# Patient Record
Sex: FEMALE | Race: WHITE | NOT HISPANIC OR LATINO | Employment: OTHER | URBAN - NONMETROPOLITAN AREA
[De-identification: names, ages, dates, MRNs, and addresses within clinical notes are randomized per-mention and may not be internally consistent; named-entity substitution may affect disease eponyms.]

---

## 2023-04-27 ENCOUNTER — APPOINTMENT (EMERGENCY)
Dept: CT IMAGING | Facility: HOSPITAL | Age: 48
End: 2023-04-27

## 2023-04-27 ENCOUNTER — HOSPITAL ENCOUNTER (EMERGENCY)
Facility: HOSPITAL | Age: 48
Discharge: HOME | End: 2023-04-28
Attending: EMERGENCY MEDICINE

## 2023-04-27 VITALS
OXYGEN SATURATION: 97 % | DIASTOLIC BLOOD PRESSURE: 106 MMHG | HEIGHT: 68 IN | HEART RATE: 119 BPM | WEIGHT: 201 LBS | BODY MASS INDEX: 30.46 KG/M2 | RESPIRATION RATE: 18 BRPM | TEMPERATURE: 99 F | SYSTOLIC BLOOD PRESSURE: 196 MMHG

## 2023-04-27 DIAGNOSIS — K12.2 CELLULITIS OF MOUTH: ICD-10-CM

## 2023-04-27 DIAGNOSIS — R79.1 SUPRATHERAPEUTIC INR: ICD-10-CM

## 2023-04-27 DIAGNOSIS — K04.7 DENTAL ABSCESS: Primary | ICD-10-CM

## 2023-04-27 LAB
ANION GAP SERPL CALCULATED.3IONS-SCNC: 11 MMOL/L (ref 4–13)
BASOPHILS # BLD AUTO: 0.04 THOUSANDS/ΜL (ref 0–0.1)
BASOPHILS NFR BLD AUTO: 1 % (ref 0–1)
BUN SERPL-MCNC: 8 MG/DL (ref 5–25)
CALCIUM SERPL-MCNC: 9.4 MG/DL (ref 8.4–10.2)
CHLORIDE SERPL-SCNC: 100 MMOL/L (ref 96–108)
CO2 SERPL-SCNC: 25 MMOL/L (ref 21–32)
CREAT SERPL-MCNC: 0.72 MG/DL (ref 0.6–1.3)
CRP SERPL QL: 35.8 MG/L
EOSINOPHIL # BLD AUTO: 0.03 THOUSAND/ΜL (ref 0–0.61)
EOSINOPHIL NFR BLD AUTO: 0 % (ref 0–6)
ERYTHROCYTE [DISTWIDTH] IN BLOOD BY AUTOMATED COUNT: 17.1 % (ref 11.6–15.1)
ERYTHROCYTE [SEDIMENTATION RATE] IN BLOOD: 58 MM/HOUR (ref 0–19)
GFR SERPL CREATININE-BSD FRML MDRD: 98 ML/MIN/1.73SQ M
GLUCOSE SERPL-MCNC: 170 MG/DL (ref 65–140)
HCT VFR BLD AUTO: 40 % (ref 34.8–46.1)
HGB BLD-MCNC: 12.9 G/DL (ref 11.5–15.4)
IMM GRANULOCYTES # BLD AUTO: 0.03 THOUSAND/UL (ref 0–0.2)
IMM GRANULOCYTES NFR BLD AUTO: 0 % (ref 0–2)
LYMPHOCYTES # BLD AUTO: 0.99 THOUSANDS/ΜL (ref 0.6–4.47)
LYMPHOCYTES NFR BLD AUTO: 13 % (ref 14–44)
MCH RBC QN AUTO: 28.7 PG (ref 26.8–34.3)
MCHC RBC AUTO-ENTMCNC: 32.3 G/DL (ref 31.4–37.4)
MCV RBC AUTO: 89 FL (ref 82–98)
MONOCYTES # BLD AUTO: 0.35 THOUSAND/ΜL (ref 0.17–1.22)
MONOCYTES NFR BLD AUTO: 5 % (ref 4–12)
NEUTROPHILS # BLD AUTO: 6.32 THOUSANDS/ΜL (ref 1.85–7.62)
NEUTS SEG NFR BLD AUTO: 81 % (ref 43–75)
NRBC BLD AUTO-RTO: 0 /100 WBCS
PLATELET # BLD AUTO: 256 THOUSANDS/UL (ref 149–390)
PMV BLD AUTO: 9.4 FL (ref 8.9–12.7)
POTASSIUM SERPL-SCNC: 3.5 MMOL/L (ref 3.5–5.3)
RBC # BLD AUTO: 4.5 MILLION/UL (ref 3.81–5.12)
SODIUM SERPL-SCNC: 136 MMOL/L (ref 135–147)
WBC # BLD AUTO: 7.76 THOUSAND/UL (ref 4.31–10.16)

## 2023-04-27 RX ORDER — DEXAMETHASONE SODIUM PHOSPHATE 4 MG/ML
10 INJECTION, SOLUTION INTRA-ARTICULAR; INTRALESIONAL; INTRAMUSCULAR; INTRAVENOUS; SOFT TISSUE ONCE
Status: COMPLETED | OUTPATIENT
Start: 2023-04-27 | End: 2023-04-27

## 2023-04-27 RX ORDER — KETOROLAC TROMETHAMINE 30 MG/ML
15 INJECTION, SOLUTION INTRAMUSCULAR; INTRAVENOUS ONCE
Status: COMPLETED | OUTPATIENT
Start: 2023-04-27 | End: 2023-04-27

## 2023-04-27 RX ADMIN — KETOROLAC TROMETHAMINE 15 MG: 30 INJECTION, SOLUTION INTRAMUSCULAR; INTRAVENOUS at 21:45

## 2023-04-27 RX ADMIN — SODIUM CHLORIDE 1000 ML: 0.9 INJECTION, SOLUTION INTRAVENOUS at 21:46

## 2023-04-27 RX ADMIN — SODIUM CHLORIDE 3 G: 9 INJECTION, SOLUTION INTRAVENOUS at 22:54

## 2023-04-27 RX ADMIN — DEXAMETHASONE SODIUM PHOSPHATE 10 MG: 4 INJECTION, SOLUTION INTRAMUSCULAR; INTRAVENOUS at 23:41

## 2023-04-27 RX ADMIN — IOHEXOL 85 ML: 350 INJECTION, SOLUTION INTRAVENOUS at 22:16

## 2023-04-28 LAB
INR PPP: 5.15 (ref 0.84–1.19)
PROTHROMBIN TIME: 47.4 SECONDS (ref 11.6–14.5)

## 2023-04-28 RX ORDER — AMOXICILLIN AND CLAVULANATE POTASSIUM 875; 125 MG/1; MG/1
1 TABLET, FILM COATED ORAL EVERY 12 HOURS
Qty: 28 TABLET | Refills: 0 | Status: SHIPPED | OUTPATIENT
Start: 2023-04-28 | End: 2023-05-12

## 2023-04-28 RX ORDER — OXYCODONE HYDROCHLORIDE 5 MG/1
5 TABLET ORAL EVERY 4 HOURS PRN
Qty: 10 TABLET | Refills: 0 | Status: SHIPPED | OUTPATIENT
Start: 2023-04-28

## 2023-04-28 RX ORDER — OXYCODONE HYDROCHLORIDE 5 MG/1
5 TABLET ORAL ONCE
Status: COMPLETED | OUTPATIENT
Start: 2023-04-28 | End: 2023-04-28

## 2023-04-28 RX ADMIN — OXYCODONE HYDROCHLORIDE 5 MG: 5 TABLET ORAL at 00:50

## 2023-04-28 NOTE — ED PROVIDER NOTES
History  Chief Complaint   Patient presents with   • Dental Pain     Left lower dental pain x 3 days  Pt scripted Norco & Amoxicillin but it is on backorder at pharm  HPI   47F  w hx of antiphospholipid syndrome and PE on coumadin presenting with dental pain  She has been seeing her PCP for dental pain and was prescribed Norco and amoxicillin  However, the medications have been on backorder since 4/21  She has been taking extra-strength Tylenol for pain and doing salt water rinses  She has appointment with PCP and social security tomorrow to find a dentist as she does not have the right insurance  No fevers  Past Medical History:   Diagnosis Date   • Antiphospholipid syndrome (HCC)    • Crohn disease ([facility] [address] )    • Gastro-esophageal reflux    • Hypothyroid    • Immunocompromised (HCC)    • Iron deficiency anemia        History reviewed  No pertinent surgical history  History reviewed  No pertinent family history  I have reviewed and agree with the history as documented  E-Cigarette/Vaping     E-Cigarette/Vaping Substances     Social History     Tobacco Use   • Smoking status: Every Day     Packs/day: 0 25     Types: Cigarettes   • Smokeless tobacco: Never   Substance Use Topics   • Alcohol use: Not Currently   • Drug use: Never       Review of Systems   Constitutional: Negative for chills and fever  HENT: Positive for dental problem and facial swelling  Negative for ear pain and sore throat  Eyes: Negative for pain and visual disturbance  Respiratory: Negative for cough and shortness of breath  Cardiovascular: Negative for chest pain and palpitations  Gastrointestinal: Negative for abdominal pain and vomiting  Genitourinary: Negative for dysuria and hematuria  Musculoskeletal: Negative for arthralgias and back pain  Skin: Negative for color change and rash  Neurological: Negative for seizures and syncope  All other systems reviewed and are negative        Physical Exam  Physical Exam  Vitals and nursing note reviewed  Constitutional:       General: She is not in acute distress  Appearance: She is well-developed  HENT:      Head: Normocephalic and atraumatic  Comments: Left facial and jaw swelling  Tolerating own secretions  Right Ear: External ear normal       Left Ear: External ear normal       Nose: Nose normal       Mouth/Throat:      Dentition: Abnormal dentition  Dental tenderness present  Comments: Floor of mouth is soft  Eyes:      Conjunctiva/sclera: Conjunctivae normal    Cardiovascular:      Rate and Rhythm: Normal rate and regular rhythm  Pulmonary:      Effort: Pulmonary effort is normal  No respiratory distress  Breath sounds: Normal breath sounds  Abdominal:      Palpations: Abdomen is soft  Tenderness: There is no abdominal tenderness  Musculoskeletal:      Cervical back: Normal range of motion and neck supple  Skin:     General: Skin is warm and dry  Neurological:      General: No focal deficit present  Mental Status: She is alert  Mental status is at baseline           Vital Signs  ED Triage Vitals   Temperature Pulse Respirations Blood Pressure SpO2   04/27/23 2118 04/27/23 2118 04/27/23 2118 04/27/23 2118 04/27/23 2118   99 °F (37 2 °C) (!) 119 18 (!) 196/106 97 %      Temp Source Heart Rate Source Patient Position - Orthostatic VS BP Location FiO2 (%)   04/27/23 2118 04/27/23 2118 04/27/23 2118 04/27/23 2118 --   Temporal Monitor Sitting Left arm       Pain Score       04/27/23 2145       9           Vitals:    04/27/23 2118   BP: (!) 196/106   Pulse: (!) 119   Patient Position - Orthostatic VS: Sitting         Visual Acuity      ED Medications  Medications   ketorolac (TORADOL) injection 15 mg (15 mg Intravenous Given 4/27/23 2145)   sodium chloride 0 9 % bolus 1,000 mL (0 mL Intravenous Stopped 4/27/23 2254)   iohexol (OMNIPAQUE) 350 MG/ML injection (SINGLE-DOSE) 85 mL (85 mL Intravenous Given 4/27/23 2216) ampicillin-sulbactam (UNASYN) 3 g in sodium chloride 0 9 % 100 mL IVPB (0 g Intravenous Stopped 4/27/23 2331)   dexamethasone (DECADRON) injection 10 mg (10 mg Intravenous Given 4/27/23 2341)   oxyCODONE (ROXICODONE) IR tablet 5 mg (5 mg Oral Given 4/28/23 0050)       Diagnostic Studies  Results Reviewed     Procedure Component Value Units Date/Time    Protime-INR [[id number]]  (Abnormal) Collected: 04/27/23 2344    Lab Status: Final result Specimen: Blood from Arm, Right Updated: 04/28/23 0031     Protime 47 4 seconds      INR 5 15    Sedimentation rate, automated [[id number]]  (Abnormal) Collected: 04/27/23 2145    Lab Status: Final result Specimen: Blood from Arm, Right Updated: 04/27/23 2210     Sed Rate 58 mm/hour     Basic metabolic panel [[id number]]  (Abnormal) Collected: 04/27/23 2145    Lab Status: Final result Specimen: Blood from Arm, Right Updated: 04/27/23 2207     Sodium 136 mmol/L      Potassium 3 5 mmol/L      Chloride 100 mmol/L      CO2 25 mmol/L      ANION GAP 11 mmol/L      BUN 8 mg/dL      Creatinine 0 72 mg/dL      Glucose 170 mg/dL      Calcium 9 4 mg/dL      eGFR 98 ml/min/1 73sq m     Narrative:      [place] guidelines for Chronic Kidney Disease (CKD):   •  Stage 1 with normal or high GFR (GFR > 90 mL/min/1 73 square meters)  •  Stage 2 Mild CKD (GFR = 60-89 mL/min/1 73 square meters)  •  Stage 3A Moderate CKD (GFR = 45-59 mL/min/1 73 square meters)  •  Stage 3B Moderate CKD (GFR = 30-44 mL/min/1 73 square meters)  •  Stage 4 Severe CKD (GFR = 15-29 mL/min/1 73 square meters)  •  Stage 5 End Stage CKD (GFR <15 mL/min/1 73 square meters)  Note: GFR calculation is accurate only with a steady state creatinine    C-reactive protein [[id number]]  (Abnormal) Collected: 04/27/23 2145    Lab Status: Final result Specimen: Blood from Arm, Right Updated: 04/27/23 2207     CRP 35 8 mg/L     CBC and differential [[id number]]  (Abnormal) Collected: 04/27/23 6671    Lab Status: Final result Specimen: Blood from Arm, Right Updated: 04/27/23 2150     WBC 7 76 Thousand/uL      RBC 4 50 Million/uL      Hemoglobin 12 9 g/dL      Hematocrit 40 0 %      MCV 89 fL      MCH 28 7 pg      MCHC 32 3 g/dL      RDW 17 1 %      MPV 9 4 fL      Platelets 896 Thousands/uL      nRBC 0 /100 WBCs      Neutrophils Relative 81 %      Immat GRANS % 0 %      Lymphocytes Relative 13 %      Monocytes Relative 5 %      Eosinophils Relative 0 %      Basophils Relative 1 %      Neutrophils Absolute 6 32 Thousands/µL      Immature Grans Absolute 0 03 Thousand/uL      Lymphocytes Absolute 0 99 Thousands/µL      Monocytes Absolute 0 35 Thousand/µL      Eosinophils Absolute 0 03 Thousand/µL      Basophils Absolute 0 04 Thousands/µL                CT soft tissue neck with contrast   Final Result by Artis García MD (04/27 2324)      9 mm probable abscess along the left lower mandibular cortical surface, likely odontogenic in etiology given suspected periodontal disease involving several adjacent mandibular teeth  Mild surrounding cellulitic changes along the left lower jaw and    extending to the submental region  Clinical correlation and dental follow-up advised  Workstation performed: [id number]                  Procedures  Procedures       ED Course  ED Course as of 04/28/23 0229   Thu Apr 27, 2023 2206 WBC: 7 76   2206 Hemoglobin: 12 9   2328 CT Soft Tissue Neck  IMPRESSION:  9 mm probable abscess along the left lower mandibular cortical surface, likely odontogenic in etiology given suspected periodontal disease involving several adjacent mandibular teeth  Mild surrounding cellulitic changes along the left lower jaw and extending to the submental region  Clinical correlation and dental follow-up advised  Fri Apr 28, 2023   0000 I discussed results with patient  I advised hospitalization and potential transfer for OMFS, however patient declines   She does not want to stay in the hospital as she has things to do at home and plans to follow-up with her PCP tomorrow  I did discuss potential worsening of infection which could spread to the face and neck, and patient could lose her airway and become septic  Patient understands and still declines  I will reach out to OMFS regarding recommendations/ close outpatient follow-up  4305 I tigertexted on-call dental resident and OMFS surgeon on-call at [address] [place] [address]  No response  I asked PACS to reach out to OM[facility] at [facility]  0032 POCT INR(!!): 5 15   0049 I discussed with Dr Liliana Becerra, OMFS surgeon on-call at [facility]  I informed her of patient's case and elevated INR of 5 15  She would not do interventions with the elevated INR and recommends antibiotics for now  She will have the office call patient tomorrow morning to set up an appointment - likely will be for Monday or Tuesday  Advises her INR to be managed by her PCP so it can be brought down appropriately to <3 over the weekend  Medical Decision Making  47F presenting with left jaw and dental pain  +left facial swelling  No stridor  Tolerating own secretions  Ddxs include Miguel's angina vs facial abscess vs retropharyngeal abscess vs odontogenic infection vs cellulitis  Labwork w/o leukocytosis or anemia  Inflammatory markers elevated from infectious process  CT neck significant for 9 mm probable abscess along left lower mandibular cortical surface and suspected periodontal disease and cellulitis  Patient was given IV unasyn, decadron and toradol  I recommended hospitalization to patient however she declines and wants to manage her symptoms outpatient through PCP and dentist/OMFS  I spoke to on-call OMFS at [facility], who will reach out to the patient to schedule an appointment  I did check patient's INR as well as she is on coumadin and it was supratherapeutic at 5 15   Given INR elevation, OMFS discussed that INR would need to come done to <3 for them to perform intervention anyway, and the current process would be to continue antibiotics  I discussed the plan with patient  She wants to go home and follow-up outpatient with her PCP and OMFS  I advised patient hold her coumadin, and to check w her PCP in the morning regarding further instructions  Additional antibiotics (Augmentin) and prescription pain meds (oxycodone) were prescribed  I do not believe these medications are on backorder  Patient discharged in stable condition  Strict return precautions were advised  Cellulitis of mouth: acute illness or injury  Dental abscess: acute illness or injury  Supratherapeutic INR: acute illness or injury  Amount and/or Complexity of Data Reviewed  Labs: ordered  Decision-making details documented in ED Course  Radiology: ordered  Risk  Prescription drug management  Parenteral controlled substances           Disposition  Final diagnoses:   Dental abscess   Supratherapeutic INR     Time reflects when diagnosis was documented in both MDM as applicable and the Disposition within this note     Time User Action Codes Description Comment    4/27/2023 11:36 PM Carolina Veliz [K04 7] Dental abscess     4/28/2023 12:32 AM Carolina Veliz [R79 [id number]] Supratherapeutic INR       ED Disposition     ED Disposition   Discharge    Condition   Stable    Date/Time   Fri Apr 28, 2023 12:48 AM    Comment              Follow-up Information     Follow up With Specialties Details Why [address]  Referral Cardiology, Gastroenterology, General Surgery, Anesthesiology, Hematology and Oncology, Urology, Emergency Medicine, Hematology, Oncology, Diabetes Services Schedule an appointment as soon as possible for a visit  Dr Renee Nguyen at [address]  [phone]            Discharge Medication List as of 4/28/2023 12:55 AM      START taking these medications    Details   amoxicillin-clavulanate (AUGMENTIN) 875-125 mg per tablet Take 1 tablet by mouth every 12 (twelve) hours for 14 days, Starting Fri 4/28/2023, Until Fri 5/12/2023, Normal      oxyCODONE (Roxicodone) 5 immediate release tablet Take 1 tablet (5 mg total) by mouth every 4 (four) hours as needed for moderate pain for up to 10 doses Max Daily Amount: 30 mg, Starting Fri 4/28/2023, Normal             No discharge procedures on file      PDMP Review     None          ED Provider  Electronically Signed by           Johan Rios MD  04/28/23 8993       Johan Rios MD  04/28/23 2065

## 2023-04-28 NOTE — DISCHARGE INSTRUCTIONS
You have a dental abscess  You have been prescribed pain medications and antibiotics  Do NOT take or  the Norco or amoxicillin your PCP has provided  Instead, take the oxycodone AS NEEDED for pain and take the Augmentin every day twice a day as the antibiotic  Dr Jovita Loving office will be calling you tomorrow to make an appointment to follow-up with them regarding your dental issues  HOLD your warfarin and talk to your PCP regarding the elevated INR! Your INR should be <3 for you to undergo surgery for your dental infeciton  A short supply of prescription pain medication has been prescribed for you  Do NOT drink, drive, or operate heavy machinery when you take this medication  Do NOT take this medication with other prescription pain medications if you have any  This medication can make you drowsy, lethargic, dizzy, weak, and slow your breathing or you can stop breathing if you take too much  Do NOT take more than prescribed!

## 2025-03-05 NOTE — PROGRESS NOTES
Pulmonary Consultation   Harper Cardona 49 y.o. female MRN: [id number]  3/11/2025      Assessment:  HFU/pneumonia  Resolved/improved clinically  Chest x-ray/outside reports improving left lung infiltrates   Currently asymptomatic, mild/minimally productive cough    Plan:  Will obtain outside records for review  Follow-up CT chest in 3 months from last pneumonia episode, approximately mid April to ensure improvement    Chronic bronchitis  Improving since quitting smoking in December  Already has PRN albuterol  Educated about the proper inhaler use technique  If noted frequent use of PRN albuterol, will consider maintenance inhalers  PFT at [facility] recently was unremarkable no obstruction/normal FEV1    Former tobacco abuse  37-pack-year quit in 12/2024  Needs CT chest for lung cancer screening, ordered as above as a follow-up from pneumonia    Positive MIO screen  Home sleep study ordered at [facility], scheduled in May    Return in about 2 months (around 5/11/2025).        History of Present Illness     New Consult for: HFU/PNA      Background  49 y.o. female with a h/o class I obesity, acid reflux, Crohn's disease, hypothyroid, antiphospholipid antibody syndrome/PE on warfarin    Outside records are not available at this time: History obtained from the patient, also  she showed workup results on her phone.  Hospitalized in 12/2024 to [facility] for pneumonia, presented with aches, fatigue, chills cough and dyspnea.  Treated with antibiotics, chest imaging at that time showed infiltrates at the left lung.  After that she had recurrence/mild symptoms in 1/2025 rehospitalized.  Post discharge, seen by PCP noted to be stable, completed antibiotics.  Chest x-ray done few weeks ago showed clear lung fields, improvement of the pulmonary infiltrates.  She also underwent PFT/6-minute walk test that were normal.    She is a former smoker, 37-pack-year quit in 12/2024.  Reports minimally productive cough on daily basis in the a.m.,  "never been on maintenance inhalers.  Currently on PRN albuterol which she uses few times per week.    At baseline, able to walk on a surface level long distance, climb a flight of stairs without stop.    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        Studies:  Imaging and other studies: I have personally reviewed pertinent films in PACS      Pulmonary function testing:       EKG, Pathology, and Other Studies: I have personally reviewed pertinent reports.          Historical Information   Past Medical History:   Diagnosis Date    Antiphospholipid syndrome (HCC)     Crohn disease (HCC)     Gastro-esophageal reflux     Hypothyroid     Immunocompromised (HCC)     Iron deficiency anemia      History reviewed. No pertinent surgical history.  History reviewed. No pertinent family history.      Medications/Allergies: Reviewed    Vitals: Blood pressure 144/88, pulse 98, temperature 97.9 °F (36.6 °C), temperature source Temporal, resp. rate 18, height 5' 8\" (1.727 m), weight 95.3 kg (210 lb 3.2 oz), SpO2 98%. Body mass index is 31.96 kg/m². Oxygen Therapy  SpO2: 98 %      Physical Exam  Body mass index is 31.96 kg/m².   Gen: not in acute distress, obese  Neck/Eyes: supple, no adenopathy, PERRL  Ear: normal appearance, no significant hearing impairment  Nose:  normal nasal mucosa, no drainage  Mouth:  unremarkable/normal appearance of lips, teeth and gums  Oropharynx: mucosa is moist, no focal lesions or erythema  Salivary glands: soft nontender  Chest: normal respiratory efforts, clear breath sounds bilaterally  CV: RRR, no murmurs appreciated, no edema  Abdomen: soft, non " "tender  Extremities:  No observed deformity   Skin: unremarkable  Neuro: AAO X3, no focal motor deficit         Labs:  Lab Results   Component Value Date    WBC 7.76 04/27/2023    HGB 12.9 04/27/2023    HCT 40.0 04/27/2023    MCV 89 04/27/2023     04/27/2023     Lab Results   Component Value Date    CALCIUM 9.4 04/27/2023    K 3.5 04/27/2023    CO2 25 04/27/2023     04/27/2023    BUN 8 04/27/2023    CREATININE 0.72 04/27/2023     No results found for: \"IGE\"  No results found for: \"ALT\", \"AST\", \"GGT\", \"ALKPHOS\", \"BILITOT\"        Portions of the record may have been created with voice recognition software.  Occasional wrong word or \"sound a like\" substitutions may have occurred due to the inherent limitations of voice recognition software.  Read the chart carefully and recognize, using context, where substitutions have occurred    Sonal Iglesias M.D.  [facility]  "

## 2025-03-11 ENCOUNTER — CONSULT (OUTPATIENT)
Dept: PULMONOLOGY | Facility: CLINIC | Age: 50
End: 2025-03-11
Payer: COMMERCIAL

## 2025-03-11 VITALS
BODY MASS INDEX: 31.86 KG/M2 | HEART RATE: 98 BPM | OXYGEN SATURATION: 98 % | RESPIRATION RATE: 18 BRPM | TEMPERATURE: 97.9 F | WEIGHT: 210.2 LBS | DIASTOLIC BLOOD PRESSURE: 88 MMHG | HEIGHT: 68 IN | SYSTOLIC BLOOD PRESSURE: 144 MMHG

## 2025-03-11 DIAGNOSIS — F17.211 CIGARETTE NICOTINE DEPENDENCE IN REMISSION: ICD-10-CM

## 2025-03-11 DIAGNOSIS — J18.9 PNEUMONIA DUE TO INFECTIOUS ORGANISM, UNSPECIFIED LATERALITY, UNSPECIFIED PART OF LUNG: ICD-10-CM

## 2025-03-11 DIAGNOSIS — J41.0 SIMPLE CHRONIC BRONCHITIS (HCC): Primary | ICD-10-CM

## 2025-03-11 PROCEDURE — 99204 OFFICE O/P NEW MOD 45 MIN: CPT | Performed by: INTERNAL MEDICINE

## 2025-03-11 RX ORDER — LINACLOTIDE 290 UG/1
CAPSULE, GELATIN COATED ORAL
COMMUNITY
Start: 2025-02-26

## 2025-03-11 RX ORDER — WARFARIN SODIUM 7.5 MG/1
TABLET ORAL
COMMUNITY
Start: 2024-12-12

## 2025-03-11 RX ORDER — HYDROCORTISONE 25 MG/G
OINTMENT TOPICAL
COMMUNITY
Start: 2024-12-27

## 2025-03-11 RX ORDER — HYDROMORPHONE HYDROCHLORIDE 2 MG/1
TABLET ORAL
COMMUNITY
Start: 2024-12-24

## 2025-03-11 RX ORDER — ALBUTEROL SULFATE 90 UG/1
INHALANT RESPIRATORY (INHALATION)
COMMUNITY
Start: 2025-01-02

## 2025-03-11 RX ORDER — PEN NEEDLE, DIABETIC 32GX 5/32"
NEEDLE, DISPOSABLE MISCELLANEOUS
COMMUNITY
Start: 2025-02-03

## 2025-03-11 RX ORDER — INSULIN GLARGINE 100 [IU]/ML
INJECTION, SOLUTION SUBCUTANEOUS
COMMUNITY
Start: 2025-02-10

## 2025-03-11 RX ORDER — CEFPODOXIME PROXETIL 200 MG/1
TABLET, FILM COATED ORAL
COMMUNITY
Start: 2024-12-24

## 2025-03-11 RX ORDER — POTASSIUM CHLORIDE 1500 MG/1
TABLET, EXTENDED RELEASE ORAL
COMMUNITY
Start: 2025-01-16

## 2025-03-11 RX ORDER — OMEPRAZOLE 40 MG/1
CAPSULE, DELAYED RELEASE ORAL
COMMUNITY
Start: 2025-02-04

## 2025-03-11 RX ORDER — BUSPIRONE HYDROCHLORIDE 10 MG/1
TABLET ORAL
COMMUNITY
Start: 2025-02-10

## 2025-03-11 RX ORDER — ALPRAZOLAM 0.5 MG
TABLET ORAL
COMMUNITY
Start: 2025-02-14

## 2025-03-11 RX ORDER — METHOCARBAMOL 750 MG/1
TABLET, FILM COATED ORAL
COMMUNITY
Start: 2025-01-07

## 2025-03-11 RX ORDER — METHYLPREDNISOLONE 4 MG/1
TABLET ORAL
COMMUNITY
Start: 2025-01-16

## 2025-03-11 RX ORDER — LEVOFLOXACIN 750 MG/1
TABLET, FILM COATED ORAL
COMMUNITY
Start: 2025-01-07

## 2025-03-11 RX ORDER — HYDROCODONE BITARTRATE AND ACETAMINOPHEN 10; 325 MG/1; MG/1
TABLET ORAL
COMMUNITY
Start: 2025-02-14

## 2025-03-11 RX ORDER — DEXTROAMPHETAMINE SACCHARATE, AMPHETAMINE ASPARTATE, DEXTROAMPHETAMINE SULFATE AND AMPHETAMINE SULFATE 2.5; 2.5; 2.5; 2.5 MG/1; MG/1; MG/1; MG/1
TABLET ORAL
COMMUNITY
Start: 2025-02-26

## 2025-03-11 RX ORDER — METOCLOPRAMIDE 5 MG/1
TABLET ORAL
COMMUNITY
Start: 2024-12-17

## 2025-03-11 RX ORDER — MORPHINE SULFATE 15 MG/1
TABLET, FILM COATED, EXTENDED RELEASE ORAL
COMMUNITY
Start: 2024-12-24

## 2025-04-10 PROBLEM — J18.9 PNEUMONIA DUE TO INFECTIOUS ORGANISM: Status: RESOLVED | Noted: 2025-03-11 | Resolved: 2025-04-10

## 2025-05-15 ENCOUNTER — HOSPITAL ENCOUNTER (EMERGENCY)
Facility: HOSPITAL | Age: 50
Discharge: HOME | End: 2025-05-15
Attending: STUDENT IN AN ORGANIZED HEALTH CARE EDUCATION/TRAINING PROGRAM
Payer: COMMERCIAL

## 2025-05-15 ENCOUNTER — APPOINTMENT (EMERGENCY)
Dept: RADIOLOGY | Facility: HOSPITAL | Age: 50
End: 2025-05-15
Payer: COMMERCIAL

## 2025-05-15 VITALS
HEART RATE: 100 BPM | SYSTOLIC BLOOD PRESSURE: 133 MMHG | TEMPERATURE: 98 F | DIASTOLIC BLOOD PRESSURE: 80 MMHG | OXYGEN SATURATION: 96 % | RESPIRATION RATE: 18 BRPM

## 2025-05-15 DIAGNOSIS — S63.501A SPRAIN OF RIGHT WRIST, INITIAL ENCOUNTER: Primary | ICD-10-CM

## 2025-05-15 DIAGNOSIS — S80.212A ABRASION OF LEFT KNEE, INITIAL ENCOUNTER: ICD-10-CM

## 2025-05-15 DIAGNOSIS — M79.641 PAIN OF RIGHT HAND: ICD-10-CM

## 2025-05-15 DIAGNOSIS — M79.671 RIGHT FOOT PAIN: ICD-10-CM

## 2025-05-15 DIAGNOSIS — M79.89 HAND SWELLING: ICD-10-CM

## 2025-05-15 PROCEDURE — 73630 X-RAY EXAM OF FOOT: CPT

## 2025-05-15 PROCEDURE — 73110 X-RAY EXAM OF WRIST: CPT

## 2025-05-15 PROCEDURE — 73564 X-RAY EXAM KNEE 4 OR MORE: CPT

## 2025-05-15 PROCEDURE — 73130 X-RAY EXAM OF HAND: CPT

## 2025-05-15 PROCEDURE — 99283 EMERGENCY DEPT VISIT LOW MDM: CPT

## 2025-05-15 NOTE — DISCHARGE INSTRUCTIONS
The x-rays that were obtained did not show signs of fracture or dislocation.    Wear the right wrist brace for comfort.  Continue to take Tylenol 1000 mg every 6 hours.    Continue to apply ice to the affected area for the next 24 to 48 hours.  20 minutes on, 20 minutes off.    Reduce the use of the right hand.    You can wash the left knee abrasion with soap and water, apply topical antibiotic ointment daily.  Keep it covered until it is healed.    Return to the emergency department for any concerning signs or symptoms.

## 2025-05-15 NOTE — ED PROVIDER NOTES
Time reflects when diagnosis was documented in both MDM as applicable and the Disposition within this note       Time User Action Codes Description Comment    5/15/2025 11:26 AM Miguel Sanchez [S63.501A] Sprain of right wrist, initial encounter     5/15/2025 11:26 AM Miguel Sanchez Add [M79.643,  M79.89] Intermittent pain and swelling of hand     5/15/2025 11:26 AM Miguel Sanchez Remove [M79.643,  M79.89] Intermittent pain and swelling of hand     5/15/2025 11:26 AM Miguel Sanchez Add [M79.89] Hand swelling     5/15/2025 11:26 AM Miguel Sanchez Add [M79.641] Pain of right hand     5/15/2025 11:26 AM Miguel Sanchez Add [M79.671] Right foot pain     5/15/2025 11:27 AM Miguel Sanchez [S80.212A] Abrasion of left knee, initial encounter           ED Disposition       ED Disposition   Discharge    Condition   Stable    Date/Time   Thu May 15, 2025 11:27 AM    Comment   Harper Cardona discharge to home/self care.                   Assessment & Plan       Medical Decision Making  49 F. Presents status post fall.  Multiple injuries.  GCS 15 upon arrival.  No focal deficits.  Low suspicion for ICH/skull fracture/mass effect/cervical spine injury.  No tenderness to palpation along the chest/abdomen/pelvis/midline spine.  Low suspicion for acute intra-abdominal/pelvic injury, compression fracture.  Distal aspects of the extremities are neurovascularly intact.  X-ray imaging obtained.  No signs of acute fracture/dislocation.  Upper/lower extremity compartments are soft.  Compartment syndrome unlikely.  Left knee abrasion is not infectious appearing.  No signs of cellulitis/soft tissue abscess.  Right wrist brace provided to the patient for comfort.  Recommendations/return precautions were discussed with the patient.  She expressed understanding.  All questions addressed.  Stable for discharge.    Problems Addressed:  Abrasion of left knee, initial encounter: acute illness or injury  Hand swelling: acute  illness or injury  Pain of right hand: acute illness or injury  Right foot pain: acute illness or injury  Sprain of right wrist, initial encounter: acute illness or injury    Amount and/or Complexity of Data Reviewed  Radiology: ordered and independent interpretation performed.     Details: X-ray imaging obtained and interpreted by me.  No signs of acute fracture/dislocation.      Medications - No data to display    ED Risk Strat Scores      No data recorded    History of Present Illness       Chief Complaint   Patient presents with    Hand Injury     Patient fell yesterday injuring hands, left knee and right ankle. Patient denies headstrike or LOC.        Past Medical History:   Diagnosis Date    Antiphospholipid syndrome (HCC)     Crohn disease (HCC)     Gastro-esophageal reflux     Hypothyroid     Immunocompromised (HCC)     Iron deficiency anemia       History reviewed. No pertinent surgical history.   History reviewed. No pertinent family history.   Social History[1]   E-Cigarette/Vaping    E-Cigarette Use Never User       E-Cigarette/Vaping Substances      I have reviewed and agree with the history as documented.     49 F.  Presents status post fall from standing yesterday.  Sustained a FOOSH injury.  Slipped on wet cement.  Patient expressing right hand/wrist, right foot, left knee pain.  Sustained an abrasion to the left knee.  Denies head injury, loss of consciousness.  Further denies altered mental status, nausea/vomiting, neck pain, chest pain, shortness of breath, worsening abdominal pain, numbness/tingling/weakness of her extremities, back pain.      History provided by:  Patient  Hand Injury  Location:  Hand  Hand location:  [place]ors[place] of R hand  Injury: yes    Time since incident:  1 day  Mechanism of injury: fall    Handedness:  Right-handed  Ineffective treatments:  Acetaminophen  Associated symptoms: decreased range of motion, muscle weakness, stiffness and swelling    Associated symptoms: no back  pain, no neck pain, no numbness and no tingling      Review of Systems   HENT:  Negative for congestion, dental problem, ear discharge and facial swelling.    Eyes:  Negative for photophobia, pain, redness and visual disturbance.   Respiratory:  Negative for cough, chest tightness, shortness of breath and wheezing.    Cardiovascular:  Negative for chest pain.   Gastrointestinal:  Negative for abdominal pain, nausea and vomiting.   Genitourinary:  Negative for flank pain and pelvic pain.   Musculoskeletal:  Positive for arthralgias, gait problem, joint swelling and stiffness. Negative for back pain, neck pain and neck stiffness.   Skin:  Positive for color change and wound. Negative for pallor and rash.   Neurological:  Negative for dizziness, syncope, speech difficulty, weakness, light-headedness and headaches.   Psychiatric/Behavioral:  Negative for confusion and decreased concentration.      Objective       ED Triage Vitals [05/15/25 0951]   Temperature Pulse Blood Pressure Respirations SpO2 Patient Position - Orthostatic VS   98 °F (36.7 °C) 100 133/80 18 96 % Sitting      Temp Source Heart Rate Source BP Location FiO2 (%) Pain Score    Temporal Monitor Left arm -- --      Vitals      Date and Time Temp Pulse SpO2 Resp BP Pain Score FACES Pain Rating User   05/15/25 0951 98 °F (36.7 °C) 100 96 % 18 133/80 -- -- AFG          Physical Exam  Vitals and nursing note reviewed.   Constitutional:       General: She is not in acute distress.     Appearance: She is not ill-appearing or toxic-appearing.   HENT:      Head: Normocephalic and atraumatic.      Right Ear: External ear normal.      Left Ear: External ear normal.      Nose: No congestion or rhinorrhea.     Eyes:      General: No scleral icterus.        Right eye: No discharge.         Left eye: No discharge.      Extraocular Movements: Extraocular movements intact.      Conjunctiva/sclera: Conjunctivae normal.      Pupils: Pupils are equal, round, and reactive to  light.       Cardiovascular:      Rate and Rhythm: Normal rate and regular rhythm.      Pulses: Normal pulses.      Heart sounds: Normal heart sounds. No murmur heard.  Pulmonary:      Effort: Pulmonary effort is normal. No respiratory distress.      Breath sounds: Normal breath sounds. No wheezing or rhonchi.   Chest:      Chest wall: No tenderness.   Abdominal:      General: Abdomen is flat.      Palpations: Abdomen is soft.      Tenderness: There is no abdominal tenderness. There is no left CVA tenderness, guarding or rebound.     Musculoskeletal:         General: Swelling, tenderness and signs of injury present. No deformity.      Cervical back: Normal range of motion and neck supple. No tenderness.      Comments: Tenderness to palpation along the ulnar aspect of the right dorsum of the hand.  There is also tenderness palpation along the right distal ulnar region.  No signs of close deformity.  Mild swelling of the hand.  Tenderness to palpation along the lateral aspect of the right foot.  Mild tenderness palpation along the anterior aspect of the left knee.  Abrasion noted.  Bleeding controlled.  No palpable knee effusion.    No tenderness to palpation along the thoracic/lumbar spine.     Skin:     General: Skin is warm and dry.      Findings: Rash present. No bruising or erythema.     Neurological:      General: No focal deficit present.      Mental Status: She is alert and oriented to person, place, and time.     Psychiatric:         Mood and Affect: Mood normal.         Behavior: Behavior normal.       Results Reviewed       None          XR hand 3+ views RIGHT   ED Interpretation by Miguel Sanchez DO (05/15 1101)   No signs of acute fracture/dislocation.  Soft tissue swelling noted.      XR wrist 3+ views RIGHT   ED Interpretation by Miguel Sanchez DO (05/15 1102)   No signs of acute fracture/dislocation.      XR foot 3+ views RIGHT   ED Interpretation by Miguel Sanchez DO (05/15 1102)   No signs  of acute fracture/dislocation.      XR knee 4+ vw left injury   ED Interpretation by Miguel Sanchez DO (05/15 1102)   No signs of acute fracture/dislocation.        Procedures    ED Medication and Procedure Management   Prior to Admission Medications   Prescriptions Last Dose Informant Patient Reported? Taking?   ALPRAZolam (XANAX) 0.5 mg tablet   Yes No   Ascorbic Acid (OMAYRA-C PO)   Yes No   Sig: Take by mouth   BD Pen Needle Lea 2nd Gen 32G X 4 MM MISC   Yes No   HYDROcodone-acetaminophen (NORCO)  mg per tablet   Yes No   HYDROmorphone (DILAUDID) 2 mg tablet   Yes No   Patient not taking: Reported on 3/11/2025   IRON SUCROSE IV   Yes No   Sig: Inject into a catheter in a vein   Lantus SoloStar 100 units/mL SOPN   Yes No   Patient taking differently: 15 units BID   Linzess 290 MCG CAPS   Yes No   Vedolizumab (ENTYVIO IV)   Yes No   Sig: Inject into a catheter in a vein EVERY 8 WEEKS   albuterol (PROVENTIL HFA,VENTOLIN HFA) 90 mcg/act inhaler   Yes No   Patient taking differently: As needed   amphetamine-dextroamphetamine (ADDERALL) 10 mg tablet   Yes No   busPIRone (BUSPAR) 10 mg tablet   Yes No   cefpodoxime (VANTIN) 200 mg tablet   Yes No   Patient not taking: Reported on 3/11/2025   hydrocortisone 2.5 % ointment   Yes No   Patient taking differently: As needed   levofloxacin (LEVAQUIN) 750 mg tablet   Yes No   Patient not taking: Reported on 3/11/2025   methocarbamol (ROBAXIN) 750 mg tablet   Yes No   Patient not taking: Reported on 3/11/2025   methylPREDNISolone 4 MG tablet therapy pack   Yes No   Patient not taking: Reported on 3/11/2025   metoclopramide (REGLAN) 5 mg tablet   Yes No   morphine (MS CONTIN) 15 mg 12 hr tablet   Yes No   Patient not taking: Reported on 3/11/2025   omeprazole (PriLOSEC) 40 MG capsule   Yes No   potassium chloride (Klor-Con M20) 20 mEq tablet   Yes No   warfarin (COUMADIN) 7.5 mg tablet   Yes No      Facility-Administered Medications: None     Discharge Medication List  as of 5/15/2025 11:27 AM        CONTINUE these medications which have NOT CHANGED    Details   albuterol (PROVENTIL HFA,VENTOLIN HFA) 90 mcg/act inhaler Historical Med      ALPRAZolam (XANAX) 0.5 mg tablet Historical Med      amphetamine-dextroamphetamine (ADDERALL) 10 mg tablet Historical Med      Ascorbic Acid (OMAYRA-C PO) Take by mouth, Historical Med      BD Pen Needle Lea 2nd Gen 32G X 4 MM MISC Historical Med      busPIRone (BUSPAR) 10 mg tablet Historical Med      cefpodoxime (VANTIN) 200 mg tablet Historical Med      HYDROcodone-acetaminophen (NORCO)  mg per tablet Historical Med      hydrocortisone 2.5 % ointment Historical Med      HYDROmorphone (DILAUDID) 2 mg tablet Historical Med      IRON SUCROSE IV Inject into a catheter in a vein, Historical Med      Lantus SoloStar 100 units/mL SOPN Historical Med      levofloxacin (LEVAQUIN) 750 mg tablet Historical Med      Linzess 290 MCG CAPS Historical Med      methocarbamol (ROBAXIN) 750 mg tablet Historical Med      methylPREDNISolone 4 MG tablet therapy pack Historical Med      metoclopramide (REGLAN) 5 mg tablet Historical Med      morphine (MS CONTIN) 15 mg 12 hr tablet Historical Med      omeprazole (PriLOSEC) 40 MG capsule Historical Med      potassium chloride (Klor-Con M20) 20 mEq tablet Historical Med      Vedolizumab (ENTYVIO IV) Inject into a catheter in a vein EVERY 8 WEEKS, Historical Med      warfarin (COUMADIN) 7.5 mg tablet Historical Med           No discharge procedures on file.  ED SEPSIS DOCUMENTATION   Time reflects when diagnosis was documented in both MDM as applicable and the Disposition within this note       Time User Action Codes Description Comment    5/15/2025 11:26 AM Miguel Sanchez [S63.501A] Sprain of right wrist, initial encounter     5/15/2025 11:26 AM Miguel Sanchez [M79.643,  M79.89] Intermittent pain and swelling of hand     5/15/2025 11:26 AM Miguel Sanchez Remove [M79.643,  M79.89] Intermittent pain and  swelling of hand     5/15/2025 11:26 AM Miguel Sanchez [M79.89] Hand swelling     5/15/2025 11:26 AM Miguel Sanchez [M79.641] Pain of right hand     5/15/2025 11:26 AM Miguel Sanchez [M79.671] Right foot pain     5/15/2025 11:27 AM Miguel Sanchez [S80.212A] Abrasion of left knee, initial encounter                      [1]   Social History  Tobacco Use    Smoking status: Some Days     Current packs/day: 0.25     Types: Cigarettes    Smokeless tobacco: Never   Vaping Use    Vaping status: Never Used   Substance Use Topics    Alcohol use: Yes     Comment: very rare    Drug use: Never        Miguel Sanchez DO  05/15/25 1242

## 2025-05-21 ENCOUNTER — TELEPHONE (OUTPATIENT)
Dept: PULMONOLOGY | Facility: CLINIC | Age: 50
End: 2025-05-21

## 2025-05-21 RX ORDER — FAMOTIDINE 40 MG/1
TABLET, FILM COATED ORAL
COMMUNITY
Start: 2025-03-18

## 2025-05-28 ENCOUNTER — HOSPITAL ENCOUNTER (OUTPATIENT)
Dept: CT IMAGING | Facility: HOSPITAL | Age: 50
Discharge: HOME | End: 2025-05-28
Attending: INTERNAL MEDICINE
Payer: COMMERCIAL

## 2025-05-28 DIAGNOSIS — J18.9 PNEUMONIA DUE TO INFECTIOUS ORGANISM, UNSPECIFIED LATERALITY, UNSPECIFIED PART OF LUNG: ICD-10-CM

## 2025-05-28 PROCEDURE — 71250 CT THORAX DX C-: CPT

## 2025-06-14 ENCOUNTER — RESULTS FOLLOW-UP (OUTPATIENT)
Dept: PULMONOLOGY | Facility: CLINIC | Age: 50
End: 2025-06-14

## 2025-06-14 DIAGNOSIS — R91.8 PULMONARY NODULES: Primary | ICD-10-CM

## 2025-06-14 NOTE — RESULT ENCOUNTER NOTE
CT chest small nodules at the bases of the lungs.    Pls call [facility] if they have any prior CT chest for her from last admission in 12/2024 or before.    Regardless will follow up CT chest in 3 months/ordered       Pls notify the pt and schedule FU appt/CT chest for her

## 2025-06-16 NOTE — TELEPHONE ENCOUNTER
----- Message from Sonal Boyce MD sent at 6/14/2025 12:31 PM EDT -----  CT chest small nodules at the bases of the lungs.    Pls call [facility] if they have any prior CT chest for her from last admission in 12/2024 or before.    Regardless will follow up CT chest in 3 months/ordered       Pls notify the pt and schedule FU appt/CT chest for her   ----- Message -----  From: Interface, Radiology Results In  Sent: 6/5/2025   6:10 AM EDT  To: Sonal Boyce MD